# Patient Record
(demographics unavailable — no encounter records)

---

## 2025-07-17 NOTE — CONSULT LETTER
[Today's Date] : [unfilled] [Name] : Name: [unfilled] [] : : ~~ [Today's Date:] : [unfilled] [Dear  ___:] : Dear Dr. [unfilled]: [Consult - Single Provider] : Thank you very much for allowing me to participate in the care of this patient. If you have any questions, please do not hesitate to contact me. [Sincerely,] : Sincerely, [FreeTextEntry4] : Shala Roca MD , MD [FreeTextEntry5] : [address] [facility], [address], [place] [address]

## 2025-07-17 NOTE — CLINICAL NARRATIVE
[Up to Date] : Up to Date [FreeTextEntry2] : BW: 2840g Todays weight: 3370g Patient is gaining 31.1g/day since Birth.

## 2025-07-17 NOTE — REASON FOR VISIT
[Initial Consultation] : an initial consultation for [Parents] : parents [FreeTextEntry3] : HX:In vitro fertilization

## 2025-07-17 NOTE — REVIEW OF SYSTEMS
[Breastmilk] : Breastmilk ~M [___ Formula] : [unfilled] Formula  [___ ounces/feeding] : ~FAIZAN peter/feeding [___ Times/day] : [unfilled] times/day

## 2025-07-17 NOTE — HISTORY OF PRESENT ILLNESS
[FreeTextEntry1] : I had the pleasure of seeing ARLEN WOODS in [facility] on 07/17/2025 for an initial consultation. As you are aware, ARLEN is a two week boy who was referred for cardiac evaluation in the setting of a maternal history of IVF and Sjogren's syndrome. She had a fetal ECHO that was normal and by report is SSA/SSB antibody negative. An ECG was obtained in the nursery, however, which showed no evidence of heart bnlock but possible RVH so he was asked to follow-up.  Overall He has been thriving at home, has been eating without difficulty, and has been gaining weight and developing appropriately. He is back above his birth weight and does not struggle to feed, breath hard or sweat with feeds.  There has been no chest pain, tachypnea, increased work of breathing, cyanosis, excessive diaphoresis, unexplained irritability, or syncope.  There is no history of sudden early death, syncope, pacemakers cardiomyopathy or heart transplant or other early onset CV issues in the family.

## 2025-07-17 NOTE — DISCUSSION/SUMMARY
[FreeTextEntry1] : In summary, KAIA is a 2 week old male with a maternal history of Sjogren's (although mom says she is SSA/SSB negative) who had a normal fetal ECHO and a  ECG without heart block but possible RVH. His ECG today was normal. Overall there is no further risk of development of heart block, and given that mom says she is not antibody positive there should also be no risk of this in future pregnancies. Overall Kaia appears to have a normal heart.  I explained to the family at length my findings as above.  The family verbalized understanding, and all questions were answered.   From a cardiac standpoint there are no physical limitations, no contraindications to any medications he should require, no cardiac contraindications to anesthesia or surgical procedures, and no requirement for SBE prophylaxis prior to procedures.   From a CV perspective all routine vaccinations including flu vaccination are recommended  No further cardiology follow-up is required, although we would be happy to see KAIA back at any time should questions or concerns arise.   [Needs SBE Prophylaxis] : [unfilled] does not need bacterial endocarditis prophylaxis [May participate in all age-appropriate activities] : [unfilled] May participate in all age-appropriate activities. [RSV prophylaxis is recommended] : RSV prophylaxis is recommended. [Influenza vaccine is recommended] : Influenza vaccine is recommended

## 2025-07-17 NOTE — CARDIOLOGY SUMMARY
[Today's Date] : [unfilled] [FreeTextEntry1] : Normal sinus rhythm, normal QRS axis, normal intervals, no hypertrophy, no pre-excitation, no ST segment or T wave abnormalities. Normal ECG.